# Patient Record
Sex: FEMALE | Race: WHITE | NOT HISPANIC OR LATINO | Employment: OTHER | URBAN - NONMETROPOLITAN AREA
[De-identification: names, ages, dates, MRNs, and addresses within clinical notes are randomized per-mention and may not be internally consistent; named-entity substitution may affect disease eponyms.]

---

## 2024-06-19 ENCOUNTER — OFFICE VISIT (OUTPATIENT)
Dept: OBSTETRICS AND GYNECOLOGY | Age: 34
End: 2024-06-19

## 2024-06-19 VITALS
BODY MASS INDEX: 38.83 KG/M2 | WEIGHT: 211 LBS | HEIGHT: 62 IN | SYSTOLIC BLOOD PRESSURE: 142 MMHG | DIASTOLIC BLOOD PRESSURE: 88 MMHG

## 2024-06-19 DIAGNOSIS — Z30.9 ENCOUNTER FOR CONTRACEPTIVE MANAGEMENT, UNSPECIFIED TYPE: Primary | ICD-10-CM

## 2024-06-19 NOTE — PROGRESS NOTES
"Chief Complaint  Establish Care (New patient transfer from Dr Mallory to establish care and refill on Low Ogestrel.  /Last pap 4/18/23 WNL/Per pt reports: hx of ovarian cancer, HPV. /Pt has no complaints today.  )  History of Present Illness  The patient presents for evaluation of multiple medical concerns.    The patient denies a history of myocardial infarction, cerebrovascular accident, or thromboembolic events.   The patient reports persistent spotting, irrespective of her contraceptive pills. She has not undergone hormone testing since her oophorectomy. She has missed a single day of birth control pills, but resumes them promptly.   She manages her severe seasonal allergies with an allergy pill.   She typically begins the new pack immediately after completing her active pills.       Celi Pate presents to [facility]  History of Present Illness    Review of Systems      Objective   Vital Signs:   /88   Ht 157.5 cm (62\")   Wt 95.7 kg (211 lb)   BMI 38.59 kg/m²     Physical Exam  Physical Exam      Result Review :                  Results         Assessment and Plan      Assessment & Plan  1. Menorrhagia.  An ultrasound of the uterus will be conducted to rule out excessive uterine lining.   A 3-month supply of birth control pills will be provided, with refills to be refilled for a year.    Diagnoses and all orders for this visit:    1. Encounter for contraceptive management, unspecified type (Primary)    Other orders  -     norgestrel-ethinyl estradiol (LOW-OGESTREL,CRYSELLE) 0.3-30 MG-MCG per tablet; Take 1 tablet by mouth Daily.  Dispense: 84 tablet; Refill: 3          Class 2 Severe Obesity (BMI >=35 and <=39.9). Obesity-related health conditions include the following: none. Obesity is unchanged. BMI is is above average; no BMI management plan is appropriate. We discussed portion control, increasing exercise, and Information on healthy weight added to " patient's after visit summary.       Follow Up   Return for Annual physical, US and OV.    Patient was given instructions and counseling regarding her condition or for health maintenance advice. Please see specific information pulled into the AVS if appropriate.       Patient or patient representative verbalized consent for the use of Ambient Listening during the visit with  LAYO Culver for chart documentation. 7/2/2024  22:11 CDT

## 2024-07-03 NOTE — PATIENT INSTRUCTIONS

## 2024-09-04 ENCOUNTER — OFFICE VISIT (OUTPATIENT)
Dept: OBSTETRICS AND GYNECOLOGY | Age: 34
End: 2024-09-04

## 2024-09-04 VITALS
SYSTOLIC BLOOD PRESSURE: 128 MMHG | WEIGHT: 206 LBS | DIASTOLIC BLOOD PRESSURE: 82 MMHG | BODY MASS INDEX: 37.91 KG/M2 | HEIGHT: 62 IN

## 2024-09-04 DIAGNOSIS — N92.6 IRREGULAR BLEEDING: ICD-10-CM

## 2024-09-04 DIAGNOSIS — Z01.419 ENCOUNTER FOR GYNECOLOGICAL EXAMINATION: Primary | ICD-10-CM

## 2024-09-04 PROCEDURE — 99395 PREV VISIT EST AGE 18-39: CPT | Performed by: NURSE PRACTITIONER

## 2024-09-04 PROCEDURE — 87624 HPV HI-RISK TYP POOLED RSLT: CPT | Performed by: NURSE PRACTITIONER

## 2024-09-04 PROCEDURE — G0123 SCREEN CERV/VAG THIN LAYER: HCPCS | Performed by: NURSE PRACTITIONER

## 2024-09-04 PROCEDURE — 87625 HPV TYPES 16 & 18 ONLY: CPT | Performed by: NURSE PRACTITIONER

## 2024-09-04 RX ORDER — MEDROXYPROGESTERONE ACETATE 5 MG
5 TABLET ORAL DAILY
Qty: 90 TABLET | Refills: 0 | Status: SHIPPED | OUTPATIENT
Start: 2024-09-04

## 2024-09-04 RX ORDER — ESTRADIOL 1 MG/1
1 TABLET ORAL DAILY
Qty: 90 TABLET | Refills: 0 | Status: SHIPPED | OUTPATIENT
Start: 2024-09-04

## 2024-09-06 LAB
GEN CATEG CVX/VAG CYTO-IMP: NORMAL
HPV I/H RISK 4 DNA CVX QL PROBE+SIG AMP: DETECTED
HPV16 DNA SPEC QL NAA+PROBE: NOT DETECTED
HPV18+45 E6+E7 MRNA CVX QL NAA+PROBE: NOT DETECTED
LAB AP CASE REPORT: NORMAL
LAB AP GYN ADDITIONAL INFORMATION: NORMAL
LAB AP GYN OTHER FINDINGS: NORMAL
Lab: NORMAL
PATH INTERP SPEC-IMP: NORMAL
STAT OF ADQ CVX/VAG CYTO-IMP: NORMAL

## 2024-12-02 RX ORDER — MEDROXYPROGESTERONE ACETATE 5 MG
5 TABLET ORAL DAILY
Qty: 90 TABLET | Refills: 0 | Status: SHIPPED | OUTPATIENT
Start: 2024-12-02

## 2024-12-02 RX ORDER — ESTRADIOL 1 MG/1
1 TABLET ORAL DAILY
Qty: 90 TABLET | Refills: 0 | Status: SHIPPED | OUTPATIENT
Start: 2024-12-02

## 2025-03-02 RX ORDER — ESTRADIOL 1 MG/1
1 TABLET ORAL DAILY
Qty: 90 TABLET | Refills: 0 | Status: CANCELLED | OUTPATIENT
Start: 2025-03-02

## 2025-03-02 RX ORDER — MEDROXYPROGESTERONE ACETATE 5 MG
5 TABLET ORAL DAILY
Qty: 90 TABLET | Refills: 0 | Status: CANCELLED | OUTPATIENT
Start: 2025-03-02

## 2025-03-05 ENCOUNTER — OFFICE VISIT (OUTPATIENT)
Dept: OBSTETRICS AND GYNECOLOGY | Age: 35
End: 2025-03-05
Payer: MEDICAID

## 2025-03-05 VITALS
DIASTOLIC BLOOD PRESSURE: 86 MMHG | SYSTOLIC BLOOD PRESSURE: 130 MMHG | WEIGHT: 218 LBS | BODY MASS INDEX: 40.12 KG/M2 | HEIGHT: 62 IN

## 2025-03-05 DIAGNOSIS — N92.6 IRREGULAR BLEEDING: Primary | ICD-10-CM

## 2025-03-05 DIAGNOSIS — Z79.890 HORMONE REPLACEMENT THERAPY (HRT): ICD-10-CM

## 2025-03-05 RX ORDER — ESTRADIOL 0.5 MG/1
0.5 TABLET ORAL DAILY
Qty: 90 TABLET | Refills: 0 | Status: SHIPPED | OUTPATIENT
Start: 2025-03-05

## 2025-03-05 RX ORDER — MEDROXYPROGESTERONE ACETATE 5 MG
5 TABLET ORAL DAILY
Qty: 90 TABLET | Refills: 0 | Status: SHIPPED | OUTPATIENT
Start: 2025-03-05

## 2025-03-05 RX ORDER — MEDROXYPROGESTERONE ACETATE 5 MG
5 TABLET ORAL DAILY
Qty: 90 TABLET | Refills: 0 | OUTPATIENT
Start: 2025-03-05

## 2025-03-05 RX ORDER — ESTRADIOL 1 MG/1
1 TABLET ORAL DAILY
Qty: 90 TABLET | Refills: 0 | OUTPATIENT
Start: 2025-03-05

## 2025-03-05 NOTE — PROGRESS NOTES
"Chief Complaint  Med Management (Pt is here for a f/u with US due to spotting with medication. Pt was prescribed Estrace 1mg and Provera 5mg Sept 2024.  Pt has no complaints today )  History of Present Illness  The patient is a 35-year-old female who presents for a follow-up on her medication.    She reports experiencing intermittent spotting, occurring approximately every other week, with each episode lasting between 2 to 3 hours before resolving. She has not experienced any hot flashes or night sweats in recent years. However, she does report persistent vaginal dryness, which she attributes to her oophorectomy. She recalls a previous instance where she experienced weekly bleeding for the first month, which subsequently diminished to a few drops over a period of several hours.        Subjective          Lianne Pate presents to [facility]  History of Present Illness    Review of Systems   Genitourinary:  Positive for vaginal bleeding.         Objective   Vital Signs:   /86   Ht 157.5 cm (62\")   Wt 98.9 kg (218 lb)   BMI 39.87 kg/m²     Physical Exam  Vitals reviewed.   Constitutional:       Appearance: She is well-developed.   Eyes:      General:         Right eye: No discharge.         Left eye: No discharge.   Cardiovascular:      Rate and Rhythm: Normal rate and regular rhythm.   Pulmonary:      Effort: Pulmonary effort is normal.      Breath sounds: Normal breath sounds.   Skin:     General: Skin is warm.   Neurological:      Mental Status: She is alert and oriented to person, place, and time.   Psychiatric:         Behavior: Behavior normal.         Thought Content: Thought content normal.         Judgment: Judgment normal.       Physical Exam      Result Review :   The following data was reviewed by: LAYO Culver on 03/05/2025:  OBGYN Diagnostics Review:   Lab Results   Component Value Date    CASEREPORT  09/04/2024     Gynecologic Cytology Report                "        Case: [id number]                                  Authorizing Provider:  Alis Gray APRN Collected:           09/04/2024 10:21 AM          Ordering Location:     [facility]     Received:            09/05/2024 06:31 AM                                 GROUP OBG[facility]                                                                  First Screen:          Rosemarie Diaz                                                              Pathologist:           Ajay Patel MD                                                      Specimen:    Liquid-Based Pap, Screening, Cervix                                                        INTERPGYN Negative for intraepithelial lesion or malignancy 09/04/2024    GENCAT Within normal limits 09/04/2024    SPECADGYN  09/04/2024     Satisfactory for evaluation, endocervical/transformation zone component present    ADDINFO  09/04/2024     Disclaimer: Cervical cytology is a screening test primarily for squamous cancer and its precursors and has associated false-negative and false-positive results.  Technologies such as liquid-based preparations may decrease but will not eliminate all false-negative results.  Follow-up of unexplained clinical signs and symptoms is recommended to minimize false-negative results. (The [place] System for Reporting Cervical Cytology: Hernandez, 2015).        HPV Aptima   Date Value Ref Range Status   09/04/2024 Detected (A) Not Detected Final      Data reviewed : Radiologic studies transvaginal US      Impression:     1.  Uterus: Normal size and Anteverted     2.  Endometrium:  7.4 mm     3.  Myometrium: Normal homogenous texture     4.  Ovaries  Left:    Not visualized and Surgically absent  Right:  Not visualized and Surgically absent     Relevant comparison data: No relevant comparison data  Todd Galeano MD  3/5/2025 12:10 CST      No current outpatient medications on file prior to visit.     No current  facility-administered medications on file prior to visit.          Assessment and Plan      Assessment & Plan  1. Abnormal uterine bleeding.  The increased uterine lining is likely causing the bleeding. She reports spotting almost every other week for 2-3 hours. She has not experienced hot flashes or night sweats for many years. To manage the bleeding, the estrogen dosage will be reduced while maintaining the current progesterone dosage. This adjustment aims to thin the uterine lining and reduce bleeding. She may experience a full-blown period initially, but it is expected to resolve after the first month. The prescription for the adjusted doses has been sent to the [facility].    Follow-up  The patient will follow up in 3 months for an ultrasound and office visit.    Diagnoses and all orders for this visit:    1. Irregular bleeding (Primary)    2. Hormone replacement therapy (HRT)    Other orders  -     estradiol (ESTRACE) 0.5 MG tablet; Take 1 tablet by mouth Daily.  Dispense: 90 tablet; Refill: 0  -     medroxyPROGESTERone (PROVERA) 5 MG tablet; Take 1 tablet by mouth Daily.  Dispense: 90 tablet; Refill: 0                  Follow Up   Return in about 3 months (around 6/5/2025) for US and OV.    Patient was given instructions and counseling regarding her condition or for health maintenance advice. Please see specific information pulled into the AVS if appropriate.       Patient or patient representative verbalized consent for the use of Ambient Listening during the visit with  LAYO Culver for chart documentation. 3/9/2025  22:44 CDT

## 2025-03-10 NOTE — PATIENT INSTRUCTIONS

## 2025-05-29 RX ORDER — MEDROXYPROGESTERONE ACETATE 5 MG
5 TABLET ORAL DAILY
Qty: 90 TABLET | Refills: 0 | Status: SHIPPED | OUTPATIENT
Start: 2025-05-29

## 2025-05-29 RX ORDER — ESTRADIOL 0.5 MG/1
0.5 TABLET ORAL DAILY
Qty: 90 TABLET | Refills: 0 | Status: SHIPPED | OUTPATIENT
Start: 2025-05-29

## 2025-06-06 ENCOUNTER — OFFICE VISIT (OUTPATIENT)
Age: 35
End: 2025-06-06
Payer: MEDICAID

## 2025-06-06 VITALS
BODY MASS INDEX: 41.22 KG/M2 | WEIGHT: 224 LBS | HEIGHT: 62 IN | DIASTOLIC BLOOD PRESSURE: 86 MMHG | SYSTOLIC BLOOD PRESSURE: 128 MMHG

## 2025-06-06 DIAGNOSIS — Z79.890 HORMONE REPLACEMENT THERAPY (HRT): Primary | ICD-10-CM

## 2025-06-06 RX ORDER — MEDROXYPROGESTERONE ACETATE 5 MG
5 TABLET ORAL DAILY
Qty: 90 TABLET | Refills: 3 | Status: SHIPPED | OUTPATIENT
Start: 2025-06-06

## 2025-06-06 RX ORDER — ESTRADIOL 0.5 MG/1
0.5 TABLET ORAL DAILY
Qty: 90 TABLET | Refills: 3 | Status: SHIPPED | OUTPATIENT
Start: 2025-06-06
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
## 2025-06-19 NOTE — PATIENT INSTRUCTIONS
